# Patient Record
Sex: MALE | Race: WHITE | NOT HISPANIC OR LATINO | URBAN - METROPOLITAN AREA
[De-identification: names, ages, dates, MRNs, and addresses within clinical notes are randomized per-mention and may not be internally consistent; named-entity substitution may affect disease eponyms.]

---

## 2024-10-08 ENCOUNTER — OFFICE VISIT (OUTPATIENT)
Dept: URGENT CARE | Age: 24
End: 2024-10-08
Payer: COMMERCIAL

## 2024-10-08 VITALS
DIASTOLIC BLOOD PRESSURE: 76 MMHG | HEART RATE: 76 BPM | RESPIRATION RATE: 16 BRPM | OXYGEN SATURATION: 97 % | WEIGHT: 170 LBS | SYSTOLIC BLOOD PRESSURE: 123 MMHG | TEMPERATURE: 97.8 F

## 2024-10-08 DIAGNOSIS — R05.1 ACUTE COUGH: ICD-10-CM

## 2024-10-08 DIAGNOSIS — J06.9 ACUTE URI: Primary | ICD-10-CM

## 2024-10-08 PROCEDURE — 99203 OFFICE O/P NEW LOW 30 MIN: CPT | Performed by: PHYSICIAN ASSISTANT

## 2024-10-08 RX ORDER — AMOXICILLIN AND CLAVULANATE POTASSIUM 875; 125 MG/1; MG/1
875 TABLET, FILM COATED ORAL 2 TIMES DAILY
Qty: 20 TABLET | Refills: 0 | Status: SHIPPED | OUTPATIENT
Start: 2024-10-08 | End: 2024-10-18

## 2024-10-08 RX ORDER — BENZONATATE 200 MG/1
200 CAPSULE ORAL 3 TIMES DAILY PRN
Qty: 30 CAPSULE | Refills: 0 | Status: SHIPPED | OUTPATIENT
Start: 2024-10-08

## 2024-10-08 ASSESSMENT — ENCOUNTER SYMPTOMS
CHILLS: 0
FEVER: 0
SINUS PRESSURE: 1
COUGH: 1
DIAPHORESIS: 0
SORE THROAT: 1
RHINORRHEA: 1
SHORTNESS OF BREATH: 0
SINUS COMPLAINT: 1

## 2024-10-08 NOTE — PROGRESS NOTES
Subjective   Patient ID: Mir Macdonald is a 24 y.o. male. They present today with a chief complaint of Sore Throat (For 1 week), Sinus Problem (Congestion x 1 week), and Headache (For 1 week).    History of Present Illness  Sore throat, nasal congestion/rhinorrhea/pressure, productive cough (worse laying) x 1 wk.    Denies fever, chills, sweats, ear pain, chest pain, SOB.       Sore Throat   Associated symptoms include congestion and coughing. Pertinent negatives include no ear pain or shortness of breath.   Sinus Problem  Associated symptoms: congestion, cough, rhinorrhea and sore throat    Associated symptoms: no chest pain, no ear pain, no fever and no shortness of breath    Headache  Associated symptoms: congestion, cough, sinus pressure and sore throat    Associated symptoms: no ear pain and no fever        Past Medical History  Allergies as of 10/08/2024    (No Known Allergies)       (Not in a hospital admission)       History reviewed. No pertinent past medical history.    History reviewed. No pertinent surgical history.         Review of Systems  Review of Systems   Constitutional:  Negative for chills, diaphoresis and fever.   HENT:  Positive for congestion, rhinorrhea, sinus pressure and sore throat. Negative for ear pain.    Respiratory:  Positive for cough. Negative for shortness of breath.    Cardiovascular:  Negative for chest pain.   All other systems reviewed and are negative.                                 Objective    Vitals:    10/08/24 1208   BP: 123/76   BP Location: Left arm   Patient Position: Sitting   BP Cuff Size: Adult   Pulse: 76   Resp: 16   Temp: 36.6 °C (97.8 °F)   TempSrc: Oral   SpO2: 97%   Weight: 77.1 kg (170 lb)     No LMP for male patient.    Physical Exam  Vitals and nursing note reviewed.   Constitutional:       General: He is not in acute distress.  HENT:      Right Ear: Tympanic membrane normal.      Left Ear: Tympanic membrane normal.      Nose: Congestion and rhinorrhea  present.      Mouth/Throat:      Pharynx: No oropharyngeal exudate or posterior oropharyngeal erythema.   Cardiovascular:      Rate and Rhythm: Normal rate and regular rhythm.      Heart sounds: Normal heart sounds.   Pulmonary:      Effort: Pulmonary effort is normal.      Breath sounds: Normal breath sounds.   Neurological:      Mental Status: He is alert.         Procedures    Point of Care Test & Imaging Results from this visit  No results found for this visit on 10/08/24.   No results found.    Diagnostic study results (if any) were reviewed by Becca Horta PA-C.    Assessment/Plan   Allergies, medications, history, and pertinent labs/EKGs/Imaging reviewed by Becca Horta PA-C.     Orders and Diagnoses  There are no diagnoses linked to this encounter.    Medical Admin Record      Patient disposition: Home    Electronically signed by Becca Horta PA-C  12:24 PM

## 2024-11-11 ENCOUNTER — OFFICE VISIT (OUTPATIENT)
Dept: URGENT CARE | Age: 24
End: 2024-11-11
Payer: COMMERCIAL

## 2024-11-11 VITALS
HEART RATE: 103 BPM | SYSTOLIC BLOOD PRESSURE: 117 MMHG | OXYGEN SATURATION: 98 % | RESPIRATION RATE: 20 BRPM | DIASTOLIC BLOOD PRESSURE: 78 MMHG | HEIGHT: 76 IN | TEMPERATURE: 98.6 F | WEIGHT: 170 LBS | BODY MASS INDEX: 20.7 KG/M2

## 2024-11-11 DIAGNOSIS — J01.00 ACUTE NON-RECURRENT MAXILLARY SINUSITIS: Primary | ICD-10-CM

## 2024-11-11 PROCEDURE — 3008F BODY MASS INDEX DOCD: CPT | Performed by: REGISTERED NURSE

## 2024-11-11 PROCEDURE — 99213 OFFICE O/P EST LOW 20 MIN: CPT | Performed by: REGISTERED NURSE

## 2024-11-11 RX ORDER — AZITHROMYCIN 250 MG/1
TABLET, FILM COATED ORAL
Qty: 6 TABLET | Refills: 0 | Status: SHIPPED | OUTPATIENT
Start: 2024-11-11

## 2024-11-11 NOTE — PROGRESS NOTES
"Subjective   Patient ID: Mir Macdonald is a 24 y.o. male. They present today with a chief complaint of Cough (5 days started feeling pain, body aches and sore throat and pain behind eyes, fever since Friday morning ).    History of Present Illness  24-year-old male patient presents with complaints of a cough x 5 days and he has now started to feel body aches and a sore throat with pain behind his eyes and a on again off again fever since Friday.  Patient states he has been taking Tylenol for the headache with some relief.  He denies any shortness of breath, chest pain, ear pain, nausea vomiting or diarrhea.       History provided by:  Patient      Past Medical History  Allergies as of 11/11/2024    (No Known Allergies)       (Not in a hospital admission)       No past medical history on file.    No past surgical history on file.         Review of Systems  Review of Systems   All other systems reviewed and are negative.                                 Objective    Vitals:    11/11/24 1242   BP: 117/78   BP Location: Left arm   Patient Position: Sitting   BP Cuff Size: Adult   Pulse: 103   Resp: 20   Temp: 37 °C (98.6 °F)   TempSrc: Oral   SpO2: 98%   Weight: 77.1 kg (170 lb)   Height: 1.93 m (6' 4\")     No LMP for male patient.    Physical Exam  Vitals and nursing note reviewed.   Constitutional:       Appearance: Normal appearance.   HENT:      Head: Normocephalic.      Right Ear: Tympanic membrane normal.      Left Ear: Tympanic membrane normal.      Nose: Nasal tenderness, mucosal edema, congestion and rhinorrhea present. Rhinorrhea is purulent.      Right Turbinates: Enlarged and swollen.      Left Turbinates: Enlarged and swollen.      Right Sinus: Maxillary sinus tenderness present.      Left Sinus: Maxillary sinus tenderness present.      Mouth/Throat:      Mouth: Mucous membranes are moist.      Pharynx: Posterior oropharyngeal erythema and postnasal drip present.   Eyes:      Pupils: Pupils are equal, round, " and reactive to light.   Cardiovascular:      Rate and Rhythm: Normal rate and regular rhythm.   Pulmonary:      Effort: Pulmonary effort is normal.      Breath sounds: Normal breath sounds.   Abdominal:      General: Bowel sounds are normal.      Palpations: Abdomen is soft.   Skin:     General: Skin is warm and dry.      Capillary Refill: Capillary refill takes less than 2 seconds.   Neurological:      General: No focal deficit present.      Mental Status: He is alert.   Psychiatric:         Mood and Affect: Mood normal.         Procedures    Point of Care Test & Imaging Results from this visit  No results found for this visit on 11/11/24.   No results found.    Diagnostic study results (if any) were reviewed by Crystal L Severino, APRN-CNP.    Assessment/Plan   Allergies, medications, history, and pertinent labs/EKGs/Imaging reviewed by Crystal L Severino, APRN-CNP.     Medical Decision Making  24-year-old male patient presents with complaints of a cough x 5 days and he has now started to feel body aches and a sore throat with pain behind his eyes and a on again off again fever since Friday.  Patient states he has been taking Tylenol for the headache with some relief.  He denies any shortness of breath, chest pain, ear pain, nausea vomiting or diarrhea.   Vital signs are stable, afebrile.  Chest clear, heart is regular, belly soft and nontender.  ENT exam as above consistent with acute sinusitis. Patient is discharged to home.  Patient is educated on supportive care, return/ER precautions.  Patient is to follow-up with PCP should symptoms persist or worsen, verbalizes understanding has no further questions  Z apple  Post Nasal Drip:  Claritin or Zyrtec everyday for one week   Humidified air  Tylenol/ibuprofen as needed for pain/discomfort      Orders and Diagnoses  Diagnoses and all orders for this visit:  Acute non-recurrent maxillary sinusitis  -     azithromycin (Zithromax) 250 mg tablet; Take 2 tabs (500 mg) by  mouth today, than 1 daily for 4 days.      Medical Admin Record      Patient disposition: Home    Electronically signed by Crystal L Severino, APRN-CNP  1:33 PM

## 2025-06-08 ENCOUNTER — ANCILLARY PROCEDURE (OUTPATIENT)
Dept: URGENT CARE | Age: 25
End: 2025-06-08
Payer: COMMERCIAL

## 2025-06-08 ENCOUNTER — OFFICE VISIT (OUTPATIENT)
Dept: URGENT CARE | Age: 25
End: 2025-06-08
Payer: COMMERCIAL

## 2025-06-08 VITALS
OXYGEN SATURATION: 98 % | HEART RATE: 79 BPM | SYSTOLIC BLOOD PRESSURE: 106 MMHG | RESPIRATION RATE: 16 BRPM | TEMPERATURE: 98 F | DIASTOLIC BLOOD PRESSURE: 69 MMHG

## 2025-06-08 DIAGNOSIS — S93.401A SPRAIN OF RIGHT ANKLE, UNSPECIFIED LIGAMENT, INITIAL ENCOUNTER: Primary | ICD-10-CM

## 2025-06-08 DIAGNOSIS — M25.571 ACUTE RIGHT ANKLE PAIN: ICD-10-CM

## 2025-06-08 DIAGNOSIS — M79.671 RIGHT FOOT PAIN: ICD-10-CM

## 2025-06-08 DIAGNOSIS — S93.601A SPRAIN OF RIGHT FOOT, INITIAL ENCOUNTER: ICD-10-CM

## 2025-06-08 PROCEDURE — 73630 X-RAY EXAM OF FOOT: CPT | Mod: RIGHT SIDE | Performed by: NURSE PRACTITIONER

## 2025-06-08 PROCEDURE — 99213 OFFICE O/P EST LOW 20 MIN: CPT | Performed by: NURSE PRACTITIONER

## 2025-06-08 PROCEDURE — 73610 X-RAY EXAM OF ANKLE: CPT | Mod: RIGHT SIDE | Performed by: NURSE PRACTITIONER

## 2025-06-08 RX ORDER — METHYLPREDNISOLONE 4 MG/1
TABLET ORAL
Qty: 21 TABLET | Refills: 0 | Status: SHIPPED | OUTPATIENT
Start: 2025-06-08 | End: 2025-06-14

## 2025-06-08 RX ORDER — LIDOCAINE 50 MG/G
1 PATCH TOPICAL DAILY
Qty: 9 PATCH | Refills: 0 | Status: SHIPPED | OUTPATIENT
Start: 2025-06-08 | End: 2026-06-08

## 2025-06-08 RX ORDER — CYCLOBENZAPRINE HCL 10 MG
10 TABLET ORAL NIGHTLY PRN
Qty: 30 TABLET | Refills: 0 | Status: SHIPPED | OUTPATIENT
Start: 2025-06-08 | End: 2025-08-07

## 2025-06-08 ASSESSMENT — ENCOUNTER SYMPTOMS: ARTHRALGIAS: 1

## 2025-06-08 NOTE — PROGRESS NOTES
Subjective   Patient ID: Mir Macdonald is a 25 y.o. male. They present today with a chief complaint of rt foot pain (Rolled rt ankle 1 week ago. ).    History of Present Illness    History provided by:  Patient   used: No    Injury  Location:  Right and ankle  Severity:  Moderate  Onset quality:  Sudden  Duration:  1 week  Timing:  Constant  Progression:  Worsening  Chronicity:  New  Context:  While walking downstairs at home, his dog cut in front of him. Pt lost balanced and rolled down step and twisted his right ankle. Pt denies numbness, tingling, head injury and loss of consciousness.  Relieved by:  Ibuprofen  Worsened by:  Walking  Ineffective treatments:  Cold compress      Past Medical History  Allergies as of 06/08/2025    (No Known Allergies)       Prescriptions Prior to Admission[1]     Medical History[2]    Surgical History[3]         Review of Systems  Review of Systems   Musculoskeletal:  Positive for arthralgias.                                  Objective    Vitals:    06/08/25 1500   BP: 106/69   Pulse: 79   Resp: 16   Temp: 36.7 °C (98 °F)   SpO2: 98%     No LMP for male patient.    Physical Exam  Vitals and nursing note reviewed.   Constitutional:       Appearance: Normal appearance.   HENT:      Head: Normocephalic and atraumatic.   Cardiovascular:      Rate and Rhythm: Normal rate and regular rhythm.   Pulmonary:      Effort: Pulmonary effort is normal.      Breath sounds: Normal breath sounds.   Feet:      Comments: Right ankle with ecchymosis, swelling and tenderness. Tenderness over the medial malleolus and lateral malleolus. No tenderness over the anterior ankle mortise. The foot is nontender. Ecchymosis noted on the dorsal foot. Right lower extremity neurovascularly intact distally. The remainder of the extremity is nontender, specifically, nontender over the knee and fibular head. Limited range of motion of the right ankle due to pain.    Neurological:      Mental Status:  He is alert.         Procedures    Point of Care Test & Imaging Results from this visit  No results found for this visit on 06/08/25.   Imaging  XR ankle right 3+ views  Result Date: 6/8/2025    No acute osseous abnormality.   MACRO   None   Signed by: Jeanine Collins 6/8/2025 3:50 PM Dictation workstation:   [id number]    XR foot right 3+ views  Result Date: 6/8/2025    No acute osseous abnormality.   MACRO   None   Signed by: Jeanine Collins 6/8/2025 3:50 PM Dictation workstation:   [id number]      Cardiology, Vascular, and Other Imaging  No other imaging results found for the past 2 days      Diagnostic study results (if any) were reviewed by CATHERINE Manning.    Assessment/Plan   Allergies, medications, history, and pertinent labs/EKGs/Imaging reviewed by CATHERINE Manning.     Medical Decision Making  No referral to orthopedist as pt's from out of town.  Stop ibuprofen while taking Medrol Dosepak. Resume ibuprofen after finishing Medrol Dosepak.  Take all medications as instructed.  Rest, ice, elevation and compression discussed.  Take Tylenol and/or ibuprofen as needed for aches and pain.  Pain should improve in 4-8 weeks.  Referred to ortho.  If symptoms do not improve, advised to return and/or follow-up with PCP.  Call 911 or go to the nearest ER if the symptoms worsen.  Patient verbalized understanding of these instructions and left in stable condition.      Orders and Diagnoses  Diagnoses and all orders for this visit:  Sprain of right ankle, unspecified ligament, initial encounter  -     XR ankle right 3+ views; Future  -     XR foot right 3+ views; Future  -     methylPREDNISolone (Medrol Dospak) 4 mg tablets; Take as directed on package.  -     cyclobenzaprine (Flexeril) 10 mg tablet; Take 1 tablet (10 mg) by mouth as needed at bedtime for muscle spasms.  -     lidocaine (Lidoderm) 5 % patch; Place 1 patch over 12 hours on the skin once daily. Apply to painful area 12 hours per day, remove  for 12 hours.  Right foot pain  -     XR foot right 3+ views; Future  -     methylPREDNISolone (Medrol Dospak) 4 mg tablets; Take as directed on package.  -     cyclobenzaprine (Flexeril) 10 mg tablet; Take 1 tablet (10 mg) by mouth as needed at bedtime for muscle spasms.  -     lidocaine (Lidoderm) 5 % patch; Place 1 patch over 12 hours on the skin once daily. Apply to painful area 12 hours per day, remove for 12 hours.  Acute right ankle pain  -     XR ankle right 3+ views; Future  -     methylPREDNISolone (Medrol Dospak) 4 mg tablets; Take as directed on package.  -     cyclobenzaprine (Flexeril) 10 mg tablet; Take 1 tablet (10 mg) by mouth as needed at bedtime for muscle spasms.  -     lidocaine (Lidoderm) 5 % patch; Place 1 patch over 12 hours on the skin once daily. Apply to painful area 12 hours per day, remove for 12 hours.  Sprain of right foot, initial encounter  -     XR ankle right 3+ views; Future  -     XR foot right 3+ views; Future  -     methylPREDNISolone (Medrol Dospak) 4 mg tablets; Take as directed on package.  -     cyclobenzaprine (Flexeril) 10 mg tablet; Take 1 tablet (10 mg) by mouth as needed at bedtime for muscle spasms.  -     lidocaine (Lidoderm) 5 % patch; Place 1 patch over 12 hours on the skin once daily. Apply to painful area 12 hours per day, remove for 12 hours.      Medical Admin Record      Patient disposition: Home    Electronically signed by CATHERINE Manning  3:57 PM           [1] (Not in a hospital admission)   [2] No past medical history on file.  [3] No past surgical history on file.

## 2025-06-08 NOTE — PATIENT INSTRUCTIONS
Stop ibuprofen while taking Medrol Dosepak. Resume ibuprofen after finishing Medrol Dosepak.  Take all medications as instructed.  Rest, ice, elevation and compression discussed.  Take Tylenol and/or ibuprofen as needed for aches and pain.  Pain should improve in 4-8 weeks.  Follow up with your ortho.  If symptoms do not improve, advised to return and/or follow-up with PCP.  Call 911 or go to the nearest ER if the symptoms worsen.